# Patient Record
Sex: MALE | Race: WHITE | URBAN - METROPOLITAN AREA
[De-identification: names, ages, dates, MRNs, and addresses within clinical notes are randomized per-mention and may not be internally consistent; named-entity substitution may affect disease eponyms.]

---

## 2020-07-22 ENCOUNTER — TELEPHONE ENCOUNTER (OUTPATIENT)
Dept: URBAN - METROPOLITAN AREA CLINIC 92 | Facility: CLINIC | Age: 65
End: 2020-07-22

## 2020-07-30 ENCOUNTER — OFFICE VISIT (OUTPATIENT)
Dept: URBAN - METROPOLITAN AREA CLINIC 96 | Facility: CLINIC | Age: 65
End: 2020-07-30

## 2020-08-13 ENCOUNTER — LAB OUTSIDE AN ENCOUNTER (OUTPATIENT)
Dept: URBAN - METROPOLITAN AREA CLINIC 96 | Facility: CLINIC | Age: 65
End: 2020-08-13

## 2020-08-13 ENCOUNTER — OFFICE VISIT (OUTPATIENT)
Dept: URBAN - METROPOLITAN AREA MEDICAL CENTER 28 | Facility: MEDICAL CENTER | Age: 65
End: 2020-08-13
Payer: COMMERCIAL

## 2020-08-13 DIAGNOSIS — R93.3 ABN FINDINGS-GI TRACT: ICD-10-CM

## 2020-08-13 DIAGNOSIS — T18.2XXA FOREIGN BODY IN STOMACH: ICD-10-CM

## 2020-08-13 DIAGNOSIS — K22.8 COLUMNAR-LINED ESOPHAGUS: ICD-10-CM

## 2020-08-13 LAB
GLUCOSE POC: 101
PERFORMING LAB: (no result)

## 2020-08-13 PROCEDURE — 43235 EGD DIAGNOSTIC BRUSH WASH: CPT | Performed by: INTERNAL MEDICINE

## 2020-08-13 RX ORDER — APREMILAST 30 MG/1
TAKE 1 TABLET (30 MG) BY ORAL ROUTE 2 TIMES PER DAY APPROXIMATELY 12 HOURS APART TABLET, FILM COATED ORAL 2
Qty: 0 | Refills: 0 | Status: ACTIVE | COMMUNITY
Start: 1900-01-01 | End: 1900-01-01

## 2020-08-13 RX ORDER — CELECOXIB 200 MG/1
CAPSULE ORAL
Qty: 0 | Refills: 0 | Status: ACTIVE | COMMUNITY
Start: 1900-01-01 | End: 1900-01-01

## 2020-08-13 RX ORDER — INSULIN ASPART 100 [IU]/ML
INJECT BY SUBCUTANEOUS ROUTE PER PRESCRIBER'S INSTRUCTIONS. INSULIN DOSING REQUIRES INDIVIDUALIZATION INJECTION, SOLUTION INTRAVENOUS; SUBCUTANEOUS
Qty: 1 | Refills: 0 | Status: ACTIVE | COMMUNITY
Start: 1900-01-01 | End: 1900-01-01

## 2020-08-13 RX ORDER — INSULIN GLARGINE 100 [IU]/ML
INJECTION, SOLUTION SUBCUTANEOUS
Qty: 0 | Refills: 0 | Status: ACTIVE | COMMUNITY
Start: 1900-01-01 | End: 1900-01-01

## 2020-08-13 RX ORDER — TAMSULOSIN HYDROCHLORIDE 0.4 MG/1
CAPSULE ORAL
Qty: 0 | Refills: 0 | Status: ACTIVE | COMMUNITY
Start: 1900-01-01 | End: 1900-01-01

## 2020-08-13 RX ORDER — PREDNISONE 20 MG/1
TABLET ORAL
Qty: 0 | Refills: 0 | Status: ACTIVE | COMMUNITY
Start: 1900-01-01 | End: 1900-01-01

## 2020-08-13 RX ORDER — METHOTREXATE SODIUM 25 MG/ML
INJECTION, SOLUTION INTRA-ARTERIAL; INTRAMUSCULAR; INTRAVENOUS
Qty: 0 | Refills: 0 | Status: ACTIVE | COMMUNITY
Start: 1900-01-01 | End: 1900-01-01

## 2020-08-13 RX ORDER — CYANOCOBALAMIN 1000 UG/ML
INJECTION INTRAMUSCULAR; SUBCUTANEOUS
Qty: 0 | Refills: 0 | Status: ACTIVE | COMMUNITY
Start: 1900-01-01 | End: 1900-01-01

## 2020-08-13 RX ORDER — FLUTICASONE PROPIONATE 50 UG/1
SPRAY, METERED NASAL
Qty: 1 | Refills: 0 | Status: ACTIVE | COMMUNITY
Start: 1900-01-01 | End: 1900-01-01

## 2020-08-13 RX ORDER — ESOMEPRAZOLE MAGNESIUM 40 MG
TAKE 1 CAPSULE (40 MG) BY ORAL ROUTE 2 TIMES PER DAY CAPSULE,DELAYED RELEASE (ENTERIC COATED) ORAL 2
Qty: 60 | Refills: 11 | Status: ACTIVE | COMMUNITY
Start: 2020-04-15 | End: 2021-04-10

## 2020-08-13 RX ORDER — FLUCONAZOLE 200 MG/1
TAKE 1 TABLET (200 MG) BY ORAL ROUTE ONCE DAILY FOR 21 DAYS TABLET ORAL 1
Qty: 21 | Refills: 0 | Status: ACTIVE | COMMUNITY
Start: 1900-01-01 | End: 1900-01-01

## 2020-08-13 RX ORDER — ASPIRIN 81 MG/1
TABLET, CHEWABLE ORAL
Qty: 0 | Refills: 0 | Status: ACTIVE | COMMUNITY
Start: 1900-01-01 | End: 1900-01-01

## 2020-08-14 ENCOUNTER — TELEPHONE ENCOUNTER (OUTPATIENT)
Dept: URBAN - METROPOLITAN AREA CLINIC 96 | Facility: CLINIC | Age: 65
End: 2020-08-14

## 2020-08-14 ENCOUNTER — TELEPHONE ENCOUNTER (OUTPATIENT)
Dept: URBAN - METROPOLITAN AREA CLINIC 92 | Facility: CLINIC | Age: 65
End: 2020-08-14

## 2020-08-14 RX ORDER — NYSTATIN 500000 [USP'U]/1
1 TABLET TABLET, FILM COATED ORAL
Qty: 40 | Refills: 0 | OUTPATIENT
Start: 2020-08-14 | End: 2020-08-24

## 2020-08-17 ENCOUNTER — TELEPHONE ENCOUNTER (OUTPATIENT)
Dept: URBAN - METROPOLITAN AREA CLINIC 98 | Facility: CLINIC | Age: 65
End: 2020-08-17

## 2020-08-17 RX ORDER — NYSTATIN 100000 [USP'U]/ML
4 ML SUSPENSION ORAL
Qty: 480 | OUTPATIENT
Start: 2020-08-21 | End: 2020-09-20

## 2020-08-27 ENCOUNTER — TELEPHONE ENCOUNTER (OUTPATIENT)
Dept: URBAN - METROPOLITAN AREA CLINIC 92 | Facility: CLINIC | Age: 65
End: 2020-08-27

## 2020-08-27 RX ORDER — FLUCONAZOLE 200 MG/1
1 TABLET TABLET ORAL ONCE A DAY
Qty: 21 TABLET | Refills: 0 | OUTPATIENT
Start: 2020-08-27 | End: 2020-09-17

## 2020-08-28 ENCOUNTER — OFFICE VISIT (OUTPATIENT)
Dept: URBAN - METROPOLITAN AREA CLINIC 96 | Facility: CLINIC | Age: 65
End: 2020-08-28

## 2020-09-04 ENCOUNTER — TELEPHONE ENCOUNTER (OUTPATIENT)
Dept: URBAN - METROPOLITAN AREA CLINIC 92 | Facility: CLINIC | Age: 65
End: 2020-09-04

## 2020-09-04 ENCOUNTER — LAB OUTSIDE AN ENCOUNTER (OUTPATIENT)
Dept: URBAN - METROPOLITAN AREA CLINIC 96 | Facility: CLINIC | Age: 65
End: 2020-09-04

## 2020-09-04 ENCOUNTER — OFFICE VISIT (OUTPATIENT)
Dept: URBAN - METROPOLITAN AREA MEDICAL CENTER 28 | Facility: MEDICAL CENTER | Age: 65
End: 2020-09-04
Payer: COMMERCIAL

## 2020-09-04 DIAGNOSIS — R93.3 ABN FINDINGS-GI TRACT: ICD-10-CM

## 2020-09-04 DIAGNOSIS — K29.60 ADENOPAPILLOMATOSIS GASTRICA: ICD-10-CM

## 2020-09-04 DIAGNOSIS — T18.2XXA FOREIGN BODY IN STOMACH: ICD-10-CM

## 2020-09-04 LAB
GLUCOSE POC: 90
PERFORMING LAB: (no result)

## 2020-09-04 PROCEDURE — 43239 EGD BIOPSY SINGLE/MULTIPLE: CPT | Performed by: INTERNAL MEDICINE

## 2020-09-04 RX ORDER — FLUCONAZOLE 200 MG/1
TAKE 1 TABLET (200 MG) BY ORAL ROUTE ONCE DAILY FOR 21 DAYS TABLET ORAL 1
Qty: 21 | Refills: 0 | Status: ACTIVE | COMMUNITY
Start: 1900-01-01 | End: 1900-01-01

## 2020-09-04 RX ORDER — FLUCONAZOLE 200 MG/1
1 TABLET TABLET ORAL ONCE A DAY
Qty: 21 TABLET | Refills: 0 | Status: ACTIVE | COMMUNITY
Start: 2020-08-27 | End: 2020-09-17

## 2020-09-04 RX ORDER — CELECOXIB 200 MG/1
CAPSULE ORAL
Qty: 0 | Refills: 0 | Status: ACTIVE | COMMUNITY
Start: 1900-01-01 | End: 1900-01-01

## 2020-09-04 RX ORDER — METHOTREXATE SODIUM 25 MG/ML
INJECTION, SOLUTION INTRA-ARTERIAL; INTRAMUSCULAR; INTRAVENOUS
Qty: 0 | Refills: 0 | Status: ACTIVE | COMMUNITY
Start: 1900-01-01 | End: 1900-01-01

## 2020-09-04 RX ORDER — ESOMEPRAZOLE MAGNESIUM 40 MG
TAKE 1 CAPSULE (40 MG) BY ORAL ROUTE 2 TIMES PER DAY CAPSULE,DELAYED RELEASE (ENTERIC COATED) ORAL 2
Qty: 60 | Refills: 11 | Status: ACTIVE | COMMUNITY
Start: 2020-04-15 | End: 2021-04-10

## 2020-09-04 RX ORDER — INSULIN ASPART 100 [IU]/ML
INJECT BY SUBCUTANEOUS ROUTE PER PRESCRIBER'S INSTRUCTIONS. INSULIN DOSING REQUIRES INDIVIDUALIZATION INJECTION, SOLUTION INTRAVENOUS; SUBCUTANEOUS
Qty: 1 | Refills: 0 | Status: ACTIVE | COMMUNITY
Start: 1900-01-01 | End: 1900-01-01

## 2020-09-04 RX ORDER — NYSTATIN 100000 [USP'U]/ML
4 ML SUSPENSION ORAL
Qty: 480 | Status: ACTIVE | COMMUNITY
Start: 2020-08-21 | End: 2020-09-20

## 2020-09-04 RX ORDER — TAMSULOSIN HYDROCHLORIDE 0.4 MG/1
CAPSULE ORAL
Qty: 0 | Refills: 0 | Status: ACTIVE | COMMUNITY
Start: 1900-01-01 | End: 1900-01-01

## 2020-09-04 RX ORDER — FLUTICASONE PROPIONATE 50 UG/1
SPRAY, METERED NASAL
Qty: 1 | Refills: 0 | Status: ACTIVE | COMMUNITY
Start: 1900-01-01 | End: 1900-01-01

## 2020-09-04 RX ORDER — CYANOCOBALAMIN 1000 UG/ML
INJECTION INTRAMUSCULAR; SUBCUTANEOUS
Qty: 0 | Refills: 0 | Status: ACTIVE | COMMUNITY
Start: 1900-01-01 | End: 1900-01-01

## 2020-09-04 RX ORDER — ASPIRIN 81 MG/1
TABLET, CHEWABLE ORAL
Qty: 0 | Refills: 0 | Status: ACTIVE | COMMUNITY
Start: 1900-01-01 | End: 1900-01-01

## 2020-09-04 RX ORDER — APREMILAST 30 MG/1
TAKE 1 TABLET (30 MG) BY ORAL ROUTE 2 TIMES PER DAY APPROXIMATELY 12 HOURS APART TABLET, FILM COATED ORAL 2
Qty: 0 | Refills: 0 | Status: ACTIVE | COMMUNITY
Start: 1900-01-01 | End: 1900-01-01

## 2020-09-04 RX ORDER — PREDNISONE 20 MG/1
TABLET ORAL
Qty: 0 | Refills: 0 | Status: ACTIVE | COMMUNITY
Start: 1900-01-01 | End: 1900-01-01

## 2020-09-04 RX ORDER — INSULIN GLARGINE 100 [IU]/ML
INJECTION, SOLUTION SUBCUTANEOUS
Qty: 0 | Refills: 0 | Status: ACTIVE | COMMUNITY
Start: 1900-01-01 | End: 1900-01-01

## 2020-10-22 ENCOUNTER — OFFICE VISIT (OUTPATIENT)
Dept: URBAN - METROPOLITAN AREA MEDICAL CENTER 28 | Facility: MEDICAL CENTER | Age: 65
End: 2020-10-22

## 2021-02-23 ENCOUNTER — TELEPHONE ENCOUNTER (OUTPATIENT)
Dept: URBAN - METROPOLITAN AREA CLINIC 98 | Facility: CLINIC | Age: 66
End: 2021-02-23

## 2021-03-26 ENCOUNTER — TELEPHONE ENCOUNTER (OUTPATIENT)
Dept: URBAN - METROPOLITAN AREA CLINIC 98 | Facility: CLINIC | Age: 66
End: 2021-03-26

## 2021-03-26 RX ORDER — ESOMEPRAZOLE MAGNESIUM 40 MG
TAKE 1 CAPSULE (40 MG) BY ORAL ROUTE 2 TIMES PER DAY CAPSULE,DELAYED RELEASE (ENTERIC COATED) ORAL 2
Qty: 180 | Refills: 0

## 2021-04-21 ENCOUNTER — TELEPHONE ENCOUNTER (OUTPATIENT)
Dept: URBAN - METROPOLITAN AREA CLINIC 6 | Facility: CLINIC | Age: 66
End: 2021-04-21

## 2021-04-30 ENCOUNTER — OFFICE VISIT (OUTPATIENT)
Dept: URBAN - METROPOLITAN AREA CLINIC 96 | Facility: CLINIC | Age: 66
End: 2021-04-30
Payer: COMMERCIAL

## 2021-04-30 ENCOUNTER — WEB ENCOUNTER (OUTPATIENT)
Dept: URBAN - METROPOLITAN AREA CLINIC 96 | Facility: CLINIC | Age: 66
End: 2021-04-30

## 2021-04-30 VITALS
SYSTOLIC BLOOD PRESSURE: 176 MMHG | TEMPERATURE: 98.1 F | HEART RATE: 152 BPM | HEIGHT: 71 IN | BODY MASS INDEX: 19.88 KG/M2 | DIASTOLIC BLOOD PRESSURE: 114 MMHG | WEIGHT: 142 LBS

## 2021-04-30 DIAGNOSIS — R94.8 ABNORMAL PET SCAN OF COLON: ICD-10-CM

## 2021-04-30 DIAGNOSIS — R63.4 WEIGHT LOSS: ICD-10-CM

## 2021-04-30 DIAGNOSIS — Z85.038 HISTORY OF COLON CANCER: ICD-10-CM

## 2021-04-30 PROCEDURE — 99213 OFFICE O/P EST LOW 20 MIN: CPT | Performed by: INTERNAL MEDICINE

## 2021-04-30 RX ORDER — CELECOXIB 200 MG/1
CAPSULE ORAL
Qty: 0 | Refills: 0 | Status: ACTIVE | COMMUNITY
Start: 1900-01-01

## 2021-04-30 RX ORDER — TAMSULOSIN HYDROCHLORIDE 0.4 MG/1
CAPSULE ORAL
Qty: 0 | Refills: 0 | Status: ACTIVE | COMMUNITY
Start: 1900-01-01

## 2021-04-30 RX ORDER — FLUCONAZOLE 200 MG/1
TAKE 1 TABLET (200 MG) BY ORAL ROUTE ONCE DAILY FOR 21 DAYS TABLET ORAL 1
Qty: 21 | Refills: 0 | Status: ACTIVE | COMMUNITY
Start: 1900-01-01

## 2021-04-30 RX ORDER — FLUTICASONE PROPIONATE 50 UG/1
SPRAY, METERED NASAL
Qty: 1 | Refills: 0 | Status: ACTIVE | COMMUNITY
Start: 1900-01-01

## 2021-04-30 RX ORDER — INSULIN GLARGINE 100 [IU]/ML
INJECTION, SOLUTION SUBCUTANEOUS
Qty: 0 | Refills: 0 | Status: ACTIVE | COMMUNITY
Start: 1900-01-01

## 2021-04-30 RX ORDER — ESOMEPRAZOLE MAGNESIUM 40 MG
TAKE 1 CAPSULE (40 MG) BY ORAL ROUTE 2 TIMES PER DAY CAPSULE,DELAYED RELEASE (ENTERIC COATED) ORAL 2
Qty: 180 | Refills: 0 | Status: ACTIVE | COMMUNITY

## 2021-04-30 RX ORDER — CYANOCOBALAMIN 1000 UG/ML
INJECTION INTRAMUSCULAR; SUBCUTANEOUS
Qty: 0 | Refills: 0 | Status: ACTIVE | COMMUNITY
Start: 1900-01-01

## 2021-04-30 RX ORDER — METHOTREXATE SODIUM 25 MG/ML
INJECTION, SOLUTION INTRA-ARTERIAL; INTRAMUSCULAR; INTRAVENOUS
Qty: 0 | Refills: 0 | Status: ACTIVE | COMMUNITY
Start: 1900-01-01

## 2021-04-30 RX ORDER — PREDNISONE 20 MG/1
TABLET ORAL
Qty: 0 | Refills: 0 | Status: ACTIVE | COMMUNITY
Start: 1900-01-01

## 2021-04-30 RX ORDER — APREMILAST 30 MG/1
TAKE 1 TABLET (30 MG) BY ORAL ROUTE 2 TIMES PER DAY APPROXIMATELY 12 HOURS APART TABLET, FILM COATED ORAL 2
Qty: 0 | Refills: 0 | Status: ACTIVE | COMMUNITY
Start: 1900-01-01

## 2021-04-30 RX ORDER — INSULIN ASPART 100 [IU]/ML
INJECT BY SUBCUTANEOUS ROUTE PER PRESCRIBER'S INSTRUCTIONS. INSULIN DOSING REQUIRES INDIVIDUALIZATION INJECTION, SOLUTION INTRAVENOUS; SUBCUTANEOUS
Qty: 1 | Refills: 0 | Status: ACTIVE | COMMUNITY
Start: 1900-01-01

## 2021-04-30 RX ORDER — ASPIRIN 81 MG/1
TABLET, CHEWABLE ORAL
Qty: 0 | Refills: 0 | Status: ACTIVE | COMMUNITY
Start: 1900-01-01

## 2021-04-30 NOTE — HPI-TODAY'S VISIT:
here for f/u has not had a colonoscopy since he has had his reversal has a diagnosis of prostate cancer as well as colon cancer sees gibson carreon was trying to get egd but always aborted still losing weight

## 2021-04-30 NOTE — PHYSICAL EXAM GASTROINTESTINAL
Abdomen , soft, nontender, nondistended , no guarding or rigidity , no masses palpable , normal bowel sounds, surgical scars noted , Liver and Spleen , no hepatomegaly present , no hepatosplenomegaly , liver nontender , spleen not palpable

## 2021-05-13 ENCOUNTER — ERX REFILL RESPONSE (OUTPATIENT)
Dept: URBAN - METROPOLITAN AREA CLINIC 13 | Facility: CLINIC | Age: 66
End: 2021-05-13

## 2021-05-13 RX ORDER — CHOLESTYRAMINE 4 G/9G
TAKE 1 PACKET DISSOLVED IN 2-6 OUNCES OF WATER/NONCARBONATED BEVERAGE BY MOUTH TWICE DAILY POWDER, FOR SUSPENSION ORAL
Qty: 180 | Refills: 0

## 2021-06-03 ENCOUNTER — TELEPHONE ENCOUNTER (OUTPATIENT)
Dept: URBAN - METROPOLITAN AREA CLINIC 92 | Facility: CLINIC | Age: 66
End: 2021-06-03

## 2021-06-03 RX ORDER — ESOMEPRAZOLE MAGNESIUM 40 MG
TAKE 1 CAPSULE (40 MG) BY ORAL ROUTE 2 TIMES PER DAY CAPSULE,DELAYED RELEASE (ENTERIC COATED) ORAL ONCE A DAY
Qty: 90 | Refills: 2

## 2021-06-14 ENCOUNTER — OFFICE VISIT (OUTPATIENT)
Dept: URBAN - METROPOLITAN AREA MEDICAL CENTER 28 | Facility: MEDICAL CENTER | Age: 66
End: 2021-06-14
Payer: COMMERCIAL

## 2021-06-14 ENCOUNTER — LAB OUTSIDE AN ENCOUNTER (OUTPATIENT)
Dept: URBAN - METROPOLITAN AREA CLINIC 96 | Facility: CLINIC | Age: 66
End: 2021-06-14

## 2021-06-14 DIAGNOSIS — Z85.038 H/O COLON CANCER, STAGE I: ICD-10-CM

## 2021-06-14 DIAGNOSIS — K63.89 BACTERIAL OVERGROWTH SYNDROME: ICD-10-CM

## 2021-06-14 DIAGNOSIS — K29.60 ADENOPAPILLOMATOSIS GASTRICA: ICD-10-CM

## 2021-06-14 DIAGNOSIS — K20.80 ESOPHAGITIS DISSECANS SUPERFICIALIS: ICD-10-CM

## 2021-06-14 DIAGNOSIS — R63.4 ABNORMAL INTENTIONAL WEIGHT LOSS: ICD-10-CM

## 2021-06-14 LAB
GLUCOSE POC: 215
PERFORMING LAB: (no result)

## 2021-06-14 PROCEDURE — 43239 EGD BIOPSY SINGLE/MULTIPLE: CPT | Performed by: INTERNAL MEDICINE

## 2021-06-14 PROCEDURE — 45380 COLONOSCOPY AND BIOPSY: CPT | Performed by: INTERNAL MEDICINE

## 2021-06-14 RX ORDER — ASPIRIN 81 MG/1
TABLET, CHEWABLE ORAL
Qty: 0 | Refills: 0 | Status: ACTIVE | COMMUNITY
Start: 1900-01-01

## 2021-06-14 RX ORDER — INSULIN ASPART 100 [IU]/ML
INJECT BY SUBCUTANEOUS ROUTE PER PRESCRIBER'S INSTRUCTIONS. INSULIN DOSING REQUIRES INDIVIDUALIZATION INJECTION, SOLUTION INTRAVENOUS; SUBCUTANEOUS
Qty: 1 | Refills: 0 | Status: ACTIVE | COMMUNITY
Start: 1900-01-01

## 2021-06-14 RX ORDER — PREDNISONE 20 MG/1
TABLET ORAL
Qty: 0 | Refills: 0 | Status: ACTIVE | COMMUNITY
Start: 1900-01-01

## 2021-06-14 RX ORDER — CHOLESTYRAMINE 4 G/9G
TAKE 1 PACKET DISSOLVED IN 2-6 OUNCES OF WATER/NONCARBONATED BEVERAGE BY MOUTH TWICE DAILY POWDER, FOR SUSPENSION ORAL
Qty: 180 | Refills: 0 | Status: ACTIVE | COMMUNITY

## 2021-06-14 RX ORDER — FLUTICASONE PROPIONATE 50 UG/1
SPRAY, METERED NASAL
Qty: 1 | Refills: 0 | Status: ACTIVE | COMMUNITY
Start: 1900-01-01

## 2021-06-14 RX ORDER — METHOTREXATE SODIUM 25 MG/ML
INJECTION, SOLUTION INTRA-ARTERIAL; INTRAMUSCULAR; INTRAVENOUS
Qty: 0 | Refills: 0 | Status: ACTIVE | COMMUNITY
Start: 1900-01-01

## 2021-06-14 RX ORDER — TAMSULOSIN HYDROCHLORIDE 0.4 MG/1
CAPSULE ORAL
Qty: 0 | Refills: 0 | Status: ACTIVE | COMMUNITY
Start: 1900-01-01

## 2021-06-14 RX ORDER — CYANOCOBALAMIN 1000 UG/ML
INJECTION INTRAMUSCULAR; SUBCUTANEOUS
Qty: 0 | Refills: 0 | Status: ACTIVE | COMMUNITY
Start: 1900-01-01

## 2021-06-14 RX ORDER — CELECOXIB 200 MG/1
CAPSULE ORAL
Qty: 0 | Refills: 0 | Status: ACTIVE | COMMUNITY
Start: 1900-01-01

## 2021-06-14 RX ORDER — APREMILAST 30 MG/1
TAKE 1 TABLET (30 MG) BY ORAL ROUTE 2 TIMES PER DAY APPROXIMATELY 12 HOURS APART TABLET, FILM COATED ORAL 2
Qty: 0 | Refills: 0 | Status: ACTIVE | COMMUNITY
Start: 1900-01-01

## 2021-06-14 RX ORDER — INSULIN GLARGINE 100 [IU]/ML
INJECTION, SOLUTION SUBCUTANEOUS
Qty: 0 | Refills: 0 | Status: ACTIVE | COMMUNITY
Start: 1900-01-01

## 2021-06-14 RX ORDER — ESOMEPRAZOLE MAGNESIUM 40 MG
TAKE 1 CAPSULE (40 MG) BY ORAL ROUTE 2 TIMES PER DAY CAPSULE,DELAYED RELEASE (ENTERIC COATED) ORAL ONCE A DAY
Qty: 90 | Refills: 2 | Status: ACTIVE | COMMUNITY

## 2021-06-14 RX ORDER — FLUCONAZOLE 200 MG/1
TAKE 1 TABLET (200 MG) BY ORAL ROUTE ONCE DAILY FOR 21 DAYS TABLET ORAL 1
Qty: 21 | Refills: 0 | Status: ACTIVE | COMMUNITY
Start: 1900-01-01

## 2021-07-16 ENCOUNTER — OFFICE VISIT (OUTPATIENT)
Dept: URBAN - METROPOLITAN AREA CLINIC 96 | Facility: CLINIC | Age: 66
End: 2021-07-16

## 2021-08-09 ENCOUNTER — OFFICE VISIT (OUTPATIENT)
Dept: URBAN - METROPOLITAN AREA TELEHEALTH 2 | Facility: TELEHEALTH | Age: 66
End: 2021-08-09
Payer: COMMERCIAL

## 2021-08-09 ENCOUNTER — DASHBOARD ENCOUNTERS (OUTPATIENT)
Age: 66
End: 2021-08-09

## 2021-08-09 DIAGNOSIS — Z85.038 HISTORY OF COLON CANCER: ICD-10-CM

## 2021-08-09 DIAGNOSIS — K21.9 GERD: ICD-10-CM

## 2021-08-09 PROBLEM — 429699009: Status: ACTIVE | Noted: 2021-04-30

## 2021-08-09 PROCEDURE — 99213 OFFICE O/P EST LOW 20 MIN: CPT | Performed by: INTERNAL MEDICINE

## 2021-08-09 RX ORDER — APREMILAST 30 MG/1
TAKE 1 TABLET (30 MG) BY ORAL ROUTE 2 TIMES PER DAY APPROXIMATELY 12 HOURS APART TABLET, FILM COATED ORAL 2
Qty: 0 | Refills: 0 | Status: ACTIVE | COMMUNITY
Start: 1900-01-01

## 2021-08-09 RX ORDER — TAMSULOSIN HYDROCHLORIDE 0.4 MG/1
CAPSULE ORAL
Qty: 0 | Refills: 0 | Status: ACTIVE | COMMUNITY
Start: 1900-01-01

## 2021-08-09 RX ORDER — CYANOCOBALAMIN 1000 UG/ML
INJECTION INTRAMUSCULAR; SUBCUTANEOUS
Qty: 0 | Refills: 0 | Status: ACTIVE | COMMUNITY
Start: 1900-01-01

## 2021-08-09 RX ORDER — ASPIRIN 81 MG/1
TABLET, CHEWABLE ORAL
Qty: 0 | Refills: 0 | Status: ACTIVE | COMMUNITY
Start: 1900-01-01

## 2021-08-09 RX ORDER — PREDNISONE 20 MG/1
TABLET ORAL
Qty: 0 | Refills: 0 | Status: ACTIVE | COMMUNITY
Start: 1900-01-01

## 2021-08-09 RX ORDER — CELECOXIB 200 MG/1
CAPSULE ORAL
Qty: 0 | Refills: 0 | Status: ACTIVE | COMMUNITY
Start: 1900-01-01

## 2021-08-09 RX ORDER — INSULIN GLARGINE 100 [IU]/ML
INJECTION, SOLUTION SUBCUTANEOUS
Qty: 0 | Refills: 0 | Status: ACTIVE | COMMUNITY
Start: 1900-01-01

## 2021-08-09 RX ORDER — METHOTREXATE SODIUM 25 MG/ML
INJECTION, SOLUTION INTRA-ARTERIAL; INTRAMUSCULAR; INTRAVENOUS
Qty: 0 | Refills: 0 | Status: ACTIVE | COMMUNITY
Start: 1900-01-01

## 2021-08-09 RX ORDER — ESOMEPRAZOLE MAGNESIUM 40 MG
TAKE 1 CAPSULE (40 MG) BY ORAL ROUTE 2 TIMES PER DAY CAPSULE,DELAYED RELEASE (ENTERIC COATED) ORAL ONCE A DAY
Qty: 90 | Refills: 2 | Status: ACTIVE | COMMUNITY

## 2021-08-09 RX ORDER — CHOLESTYRAMINE 4 G/9G
TAKE 1 PACKET DISSOLVED IN 2-6 OUNCES OF WATER/NONCARBONATED BEVERAGE BY MOUTH TWICE DAILY POWDER, FOR SUSPENSION ORAL
Qty: 180 | Refills: 0 | Status: ACTIVE | COMMUNITY

## 2021-08-09 RX ORDER — FLUCONAZOLE 200 MG/1
TAKE 1 TABLET (200 MG) BY ORAL ROUTE ONCE DAILY FOR 21 DAYS TABLET ORAL 1
Qty: 21 | Refills: 0 | Status: ACTIVE | COMMUNITY
Start: 1900-01-01

## 2021-08-09 RX ORDER — INSULIN ASPART 100 [IU]/ML
INJECT BY SUBCUTANEOUS ROUTE PER PRESCRIBER'S INSTRUCTIONS. INSULIN DOSING REQUIRES INDIVIDUALIZATION INJECTION, SOLUTION INTRAVENOUS; SUBCUTANEOUS
Qty: 1 | Refills: 0 | Status: ACTIVE | COMMUNITY
Start: 1900-01-01

## 2021-08-09 RX ORDER — FLUTICASONE PROPIONATE 50 UG/1
SPRAY, METERED NASAL
Qty: 1 | Refills: 0 | Status: ACTIVE | COMMUNITY
Start: 1900-01-01

## 2021-08-09 NOTE — HPI-TODAY'S VISIT:
broke his back, has surgery in 3 days, having T10-T11 fusion had a fall doing well no c/o had prostate seeds put in, PSA low    ================== has not had a colonoscopy since he has had his reversal has a diagnosis of prostate cancer as well as colon cancer sees gibson carreon was trying to get egd but always aborted still losing weight